# Patient Record
Sex: FEMALE | Race: WHITE | NOT HISPANIC OR LATINO | Employment: STUDENT | URBAN - METROPOLITAN AREA
[De-identification: names, ages, dates, MRNs, and addresses within clinical notes are randomized per-mention and may not be internally consistent; named-entity substitution may affect disease eponyms.]

---
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
## 2025-01-08 ENCOUNTER — TELEPHONE (OUTPATIENT)
Dept: BEHAVIORAL HEALTH | Facility: PSYCHIATRIC FACILITY | Age: 20
End: 2025-01-08
Payer: COMMERCIAL

## 2025-01-08 DIAGNOSIS — F90.0 ATTENTION DEFICIT HYPERACTIVITY DISORDER (ADHD), PREDOMINANTLY INATTENTIVE TYPE: ICD-10-CM

## 2025-01-08 RX ORDER — METHYLPHENIDATE HYDROCHLORIDE 40 MG/1
40 CAPSULE, EXTENDED RELEASE ORAL EVERY MORNING
Qty: 30 CAPSULE | Refills: 0 | Status: SHIPPED | OUTPATIENT
Start: 2025-01-08 | End: 2025-01-10 | Stop reason: SDUPTHER

## 2025-01-08 NOTE — TELEPHONE ENCOUNTER
----- Message from TRISTEN ALDRICH sent at 1/7/2025  2:38 PM PST -----  Regarding: med refill  Hello patient is requesting a med refill of methylphenidate (RITALIN LA) 40 MG SR capsule. The pharm is [id number] - [place], [place] - [address] [facility].    Thank you!

## 2025-01-09 ENCOUNTER — PATIENT MESSAGE (OUTPATIENT)
Dept: BEHAVIORAL HEALTH | Facility: PSYCHIATRIC FACILITY | Age: 20
End: 2025-01-09
Payer: COMMERCIAL

## 2025-01-09 DIAGNOSIS — F90.0 ATTENTION DEFICIT HYPERACTIVITY DISORDER (ADHD), PREDOMINANTLY INATTENTIVE TYPE: ICD-10-CM

## 2025-01-10 ENCOUNTER — PATIENT MESSAGE (OUTPATIENT)
Dept: BEHAVIORAL HEALTH | Facility: PSYCHIATRIC FACILITY | Age: 20
End: 2025-01-10
Payer: COMMERCIAL

## 2025-01-10 DIAGNOSIS — F90.0 ATTENTION DEFICIT HYPERACTIVITY DISORDER (ADHD), PREDOMINANTLY INATTENTIVE TYPE: ICD-10-CM

## 2025-01-10 RX ORDER — METHYLPHENIDATE HYDROCHLORIDE 40 MG/1
40 CAPSULE, EXTENDED RELEASE ORAL EVERY MORNING
Qty: 30 CAPSULE | Refills: 0 | Status: SHIPPED | OUTPATIENT
Start: 2025-01-10 | End: 2025-01-10 | Stop reason: SDUPTHER

## 2025-01-10 RX ORDER — METHYLPHENIDATE HYDROCHLORIDE 40 MG/1
40 CAPSULE, EXTENDED RELEASE ORAL EVERY MORNING
Qty: 30 CAPSULE | Refills: 0 | Status: SHIPPED | OUTPATIENT
Start: 2025-01-10 | End: 2025-02-09

## 2025-01-10 NOTE — PROGRESS NOTES
Pharmacy is out of prescription of methylphenidate cd 40mg. Sending new prescription to [facility] in [place]

## 2025-01-21 ENCOUNTER — OFFICE VISIT (OUTPATIENT)
Dept: BEHAVIORAL HEALTH | Facility: PSYCHIATRIC FACILITY | Age: 20
End: 2025-01-21
Payer: COMMERCIAL

## 2025-01-21 VITALS
WEIGHT: 223 LBS | OXYGEN SATURATION: 95 % | HEART RATE: 89 BPM | SYSTOLIC BLOOD PRESSURE: 120 MMHG | BODY MASS INDEX: 31.22 KG/M2 | HEIGHT: 71 IN | DIASTOLIC BLOOD PRESSURE: 72 MMHG

## 2025-01-21 DIAGNOSIS — F33.41 MAJOR DEPRESSIVE DISORDER, RECURRENT EPISODE, IN PARTIAL REMISSION (HCC): Primary | ICD-10-CM

## 2025-01-21 DIAGNOSIS — F41.1 GENERALIZED ANXIETY DISORDER: ICD-10-CM

## 2025-01-21 DIAGNOSIS — F90.0 ATTENTION DEFICIT HYPERACTIVITY DISORDER (ADHD), PREDOMINANTLY INATTENTIVE TYPE: ICD-10-CM

## 2025-01-21 PROCEDURE — 3074F SYST BP LT 130 MM HG: CPT | Performed by: PSYCHIATRY & NEUROLOGY

## 2025-01-21 PROCEDURE — 3078F DIAST BP <80 MM HG: CPT | Performed by: PSYCHIATRY & NEUROLOGY

## 2025-01-21 PROCEDURE — 99214 OFFICE O/P EST MOD 30 MIN: CPT | Performed by: PSYCHIATRY & NEUROLOGY

## 2025-01-21 ASSESSMENT — PATIENT HEALTH QUESTIONNAIRE - PHQ9
5. POOR APPETITE OR OVEREATING: 1 - SEVERAL DAYS
CLINICAL INTERPRETATION OF PHQ2 SCORE: 2
SUM OF ALL RESPONSES TO PHQ QUESTIONS 1-9: 6

## 2025-01-21 ASSESSMENT — FIBROSIS 4 INDEX: FIB4 SCORE: 0.3

## 2025-01-22 NOTE — PROGRESS NOTES
"[facility] Outpatient Psychiatric Follow Up Note  Evaluation completed by: Boogie Edgar M.D.   Date of Service: 01/21/2025  Appointment type: in-office appointment.  Information below was collected from: patient and patient's mother    CHIEF COMPLIANT:  Follow-Up (ADHD, depression, anxiety)        HPI:   Patient is a 19 y.o. old female who presents today for Follow-Up (ADHD, depression, anxiety)  Patient presents with mother for appointment and has mother accompanied her into appointment patient reports she is doing \"good\".  She reports she never started the Zoloft but feels that she is doing well.  She has continued to do both individual weekly therapy as well as neurofeedback therapy and feels like her depression has been doing really good.    ADHD: Patient reports that she is doing \"good\".  She is going to school and working and states that she has been managing both.  Over the winter break she has been working full-time and states that meds are good enough to get her through the day.  She denies having significant symptoms of mental exhaustion.  She reports she is able to get tasks done he denies issues with motivation.  She is still getting up early in the morning to go to the gym.  She denies issues with forgetfulness, losing things, or getting easily distracted.    PSYCHIATRIC REVIEW OF SYSTEMS:current symptoms as reported by pt.  Depression: Patient denies Depressed mood, Difficulty sleeping, Anhedonia, Sense of hopelessness, Low energy, Low appetite, Difficulty concentrating, Passive thoughts of death, and Suicidal ideation  Taryn: Patient denies any change in mood, increased energy, or marked irritability  Anxiety/Panic Attacks: Patient reporting anxiety has improved with therapy and neuro feedback.  She reports not worrying as much and better able to do things outside of family.  She continues to report low-grade worries about the future in school but states that is not interfering with her " ability to function and she does not feel like she is spending a lot of effort or time worrying.  Psychosis: Patient denies auditory hallucinations and visual hallucinations  Safety: Patient has thoughts of harm towards others    CURRENT MEDICATIONS    Current Outpatient Medications:     methylphenidate (RITALIN LA) 40 MG SR capsule, Take 1 Capsule by mouth every morning for 30 days., Disp: 30 Capsule, Rfl: 0    sertraline (ZOLOFT) 50 MG Tab, Take 1 Tablet by mouth every day., Disp: 30 Tablet, Rfl: 11    guanFACINE (TENEX) 1 MG Tab, TAKE 1 AND 1/2 TABLETS BY MOUTH EVERY DAY, Disp: 45 Tablet, Rfl: 2    LO LOESTRIN FE 1 MG-10 MCG / 10 MCG Tab, Take 1 Tablet by mouth every day., Disp: , Rfl:      REVIEW OF SYSTEMS   Review of Systems   Constitutional:  Negative for malaise/fatigue and weight loss.   Respiratory:  Negative for cough and wheezing.    Cardiovascular:  Negative for chest pain and palpitations.   Gastrointestinal:  Negative for constipation, diarrhea, nausea and vomiting.   Neurological:  Negative for dizziness and headaches.     Neurologic: no tics, tremors, dystonia, or dyskinesia     PAST MEDICAL HISTORY  Past Medical History:   Diagnosis Date    ADD (attention deficit disorder)     Anxiety     NADIA (generalized anxiety disorder) 09/28/2016    Sensory processing difficulty      No Known Allergies  History reviewed. No pertinent surgical history.   Family History   Problem Relation Age of Onset    Hypertension Mother     Anxiety disorder Mother     Depression Mother     BRCA 2 Mother         prophylactic mastectomy    Hypertension Father     Anxiety disorder Father     Sleep Apnea Father     No Known Problems Brother     No Known Problems Brother     No Known Problems Maternal Aunt     No Known Problems Paternal Aunt     Alcohol abuse Paternal Uncle     Drug abuse Paternal Uncle     No Known Problems Maternal Grandmother     Genetic Disorder Maternal Grandfather     Breast Cancer Maternal Grandfather      "Anxiety disorder Maternal Grandfather     Depression Maternal Grandfather     BRCA 2 Maternal Grandfather     Anxiety disorder Paternal Grandmother     Drug abuse Paternal Grandmother     Alcohol abuse Paternal Grandmother     Drug abuse Paternal Grandfather     Alcohol abuse Paternal Grandfather     Bipolar disorder Cousin      Social History     Socioeconomic History    Marital status: Single    Number of children: 0   Tobacco Use    Smoking status: Never     Passive exposure: Never    Smokeless tobacco: Never   Vaping Use    Vaping status: Never Used   Substance and Sexual Activity    Alcohol use: Not Currently    Drug use: No    Sexual activity: Yes     Partners: Male     Birth control/protection: Pill   Other Topics Concern    Inadequate sleep No         PSYCHIATRIC EXAMINATION   /72 (BP Location: Left arm, Patient Position: Sitting, BP Cuff Size: Adult)   Pulse 89   Ht 1.791 m (5' 10.5\") Comment: with shoes  Wt 101 kg (223 lb)   SpO2 95%   BMI 31.54 kg/m²   Physical Exam  Constitutional:       General: She is not in acute distress.     Appearance: Normal appearance. She is normal weight.   Neurological:      Mental Status: She is alert.   Psychiatric:         Attention and Perception: Attention and perception normal. She is attentive. She does not perceive auditory or visual hallucinations.         Mood and Affect: Mood is not anxious. Affect is not labile, tearful or inappropriate.         Speech: Speech normal. She is communicative. Speech is not rapid and pressured, delayed or tangential.         Behavior: Behavior normal. Behavior is not agitated, slowed or withdrawn. Behavior is cooperative.         Thought Content: Thought content normal. Thought content is not paranoid or delusional. Thought content does not include homicidal or suicidal ideation.         Cognition and Memory: Cognition and memory normal. Cognition is not impaired. Memory is not impaired.         Judgment: Judgment normal. " Judgment is not impulsive or inappropriate.      Comments: Behavior: Patient sits calmly and engages in conversation with clinician and mother.  She is responsive and well engaged to both parties.  She demonstrates minimal fidgetiness and overall is appropriate during appointment.  Thought process: Linear and goal directed.  On task and not distractible.          SCREENINGS:      3/7/2023     1:00 PM 9/10/2024     2:55 PM 1/21/2025     4:00 PM   Depression Screen (PHQ-2/PHQ-9)   PHQ-2 Total Score  6    PHQ-2 Total Score 4  2   PHQ-9 Total Score  21    PHQ-9 Total Score 12  6          PREVENTATIVE CARE  Medication Monitoring: Stimulants: Reviewed height, weight, blood pressure, and pulse.  No concerns. Signed Controlled Substance Agreement.       [place]  records   reviewed.  No concerns about misuse of controlled substance.    CURRENT RISK ASSESSMENT       Suicide: Low       Homicide: Low       Self-Harm: Low       Relapse: Not applicable       Crisis Safety Plan Reviewed Not Indicated    ASSESSMENT/DIAGNOSES/PLAN  Major depressive disorder, recurrent episode, in partial remission (HCC)  Problem type: Chronic Illness, Stable    Assessment: Patient demonstrated significant response to depressive symptoms to discontinuation/taper of Cymbalta.  While sertraline was ordered in transition patient and restarted medication.  She has been engaged in both neurofeedback and individual weekly therapy which has not proved patient symptoms.    Plan  Medication:   -Currently we will hold off on medications specific for depression.  Patient did  prescription of Zoloft and recommended she can start this if feeling she is having continued breakthrough symptoms without full remission.  Discussed calling provider to notify if restarting Zoloft.  -Continue methylphenidate and guanfacine for ADHD.  This likely helps patient's depressive symptoms as well.      Therapy:   Continue neurofeedback  Continue individual weekly  therapy          Attention deficit hyperactivity disorder (ADHD), predominantly inattentive type  Problem type: Chronic Illness, Stable    Assessment: Patient's symptoms improved as symptoms of depression have improved as well.    Plan  Medication:    - Continue Ritalin LA 40mg po qday   - Continue Guanfacine 1mg po qhs          NADIA (generalized anxiety disorder) with panic attacks  Problem type: Chronic Illness, Stable    Assessment: Exacerbation of anxiety has improved with therapy and improvement of depressive episode.  Patient doing well currently overall with minimal symptoms of anxiety    Plan  Medication:   - Continue methylphenidate and guanfacine 1mg po qhs for ADHD and anxiety    Therapy: Recommend continuing individual psychotherapy and no feedback         Medication options, alternatives (including no medications) and medication risks/benefits/side effects were discussed in detail.  The patient was advised to call, message clinician on [employer], or come in to the clinic if symptoms worsen or if questions/issues regarding their medications arise.  The patient verbalized understanding and agreement.    The patient was educated to call 911, call the suicide hotline, or go to the local ER if having thoughts of suicide or homicide.  The patient verbalized understanding and agreement.   The proposed treatment plan was discussed with the patient who was provided the opportunity to ask questions and make suggestions regarding alternative treatment. Patient verbalized understanding and expressed agreement with the plan.      Recommending follow-up in 4 to 6 weeks.  Mother working with insurance on coverage and payment options.  Mother to call for setting up another appointment.    Please note that this dictation was created using voice recognition software. I have reviewed and attempted to correct errors, but there may be spelling, grammar and possibly content errors that I did not discover before finalizing the  note.    Boogie Edgar MD

## 2025-01-26 ASSESSMENT — ENCOUNTER SYMPTOMS
COUGH: 0
NAUSEA: 0
DIZZINESS: 0
CONSTIPATION: 0
VOMITING: 0
PALPITATIONS: 0
HEADACHES: 0
WEIGHT LOSS: 0
WHEEZING: 0
DIARRHEA: 0

## 2025-01-26 NOTE — ASSESSMENT & PLAN NOTE
Problem type: Chronic Illness, Stable    Assessment: Patient's symptoms improved as symptoms of depression have improved as well.    Plan  Medication:    - Continue Ritalin LA 40mg po qday   - Continue Guanfacine 1mg po qhs

## 2025-01-26 NOTE — ASSESSMENT & PLAN NOTE
Problem type: Chronic Illness, Stable    Assessment: Exacerbation of anxiety has improved with therapy and improvement of depressive episode.  Patient doing well currently overall with minimal symptoms of anxiety    Plan  Medication:   - Continue methylphenidate and guanfacine 1mg po qhs for ADHD and anxiety    Therapy: Recommend continuing individual psychotherapy and no feedback

## 2025-01-26 NOTE — ASSESSMENT & PLAN NOTE
Problem type: Chronic Illness, Stable    Assessment: Patient demonstrated significant response to depressive symptoms to discontinuation/taper of Cymbalta.  While sertraline was ordered in transition patient and restarted medication.  She has been engaged in both neurofeedback and individual weekly therapy which has not proved patient symptoms.    Plan  Medication:   -Currently we will hold off on medications specific for depression.  Patient did  prescription of Zoloft and recommended she can start this if feeling she is having continued breakthrough symptoms without full remission.  Discussed calling provider to notify if restarting Zoloft.  -Continue methylphenidate and guanfacine for ADHD.  This likely helps patient's depressive symptoms as well.      Therapy:   Continue neurofeedback  Continue individual weekly therapy

## 2025-02-06 ENCOUNTER — TELEPHONE (OUTPATIENT)
Dept: BEHAVIORAL HEALTH | Facility: PSYCHIATRIC FACILITY | Age: 20
End: 2025-02-06
Payer: COMMERCIAL

## 2025-02-06 DIAGNOSIS — F90.0 ATTENTION DEFICIT HYPERACTIVITY DISORDER (ADHD), PREDOMINANTLY INATTENTIVE TYPE: ICD-10-CM

## 2025-02-06 RX ORDER — METHYLPHENIDATE HYDROCHLORIDE 40 MG/1
40 CAPSULE, EXTENDED RELEASE ORAL EVERY MORNING
Qty: 30 CAPSULE | Refills: 0 | Status: SHIPPED | OUTPATIENT
Start: 2025-02-06 | End: 2025-02-06 | Stop reason: SDUPTHER

## 2025-02-06 RX ORDER — METHYLPHENIDATE HYDROCHLORIDE 40 MG/1
40 CAPSULE, EXTENDED RELEASE ORAL EVERY MORNING
Qty: 30 CAPSULE | Refills: 0 | Status: SHIPPED | OUTPATIENT
Start: 2025-03-06 | End: 2025-04-05

## 2025-02-06 RX ORDER — METHYLPHENIDATE HYDROCHLORIDE 40 MG/1
40 CAPSULE, EXTENDED RELEASE ORAL EVERY MORNING
Qty: 30 CAPSULE | Refills: 0 | Status: SHIPPED | OUTPATIENT
Start: 2025-02-06 | End: 2025-03-08

## 2025-02-06 RX ORDER — METHYLPHENIDATE HYDROCHLORIDE 40 MG/1
40 CAPSULE, EXTENDED RELEASE ORAL EVERY MORNING
Qty: 30 CAPSULE | Refills: 0 | Status: SHIPPED | OUTPATIENT
Start: 2025-03-06 | End: 2025-02-06 | Stop reason: SDUPTHER

## 2025-02-06 NOTE — TELEPHONE ENCOUNTER
----- Message from Romana ROBERTO sent at 2/5/2025  2:11 PM PST -----  Regarding: PT Med Refill  Patient is asking for a med refill on the med methylphenidate (RITALIN LA) 40 MG SR capsule. Patient does not have a follow up appt, last seen 1/21/25. If the med can be filled, please send to [id number] - [place], [place] - [phone] [[id number]]    Thank you!

## 2025-02-07 RX ORDER — GUANFACINE 1 MG/1
1.5 TABLET ORAL
Qty: 45 TABLET | Refills: 2 | Status: SHIPPED | OUTPATIENT
Start: 2025-02-07

## 2025-03-31 ENCOUNTER — PATIENT MESSAGE (OUTPATIENT)
Dept: BEHAVIORAL HEALTH | Facility: PSYCHIATRIC FACILITY | Age: 20
End: 2025-03-31
Payer: COMMERCIAL

## 2025-03-31 DIAGNOSIS — F90.0 ATTENTION DEFICIT HYPERACTIVITY DISORDER (ADHD), PREDOMINANTLY INATTENTIVE TYPE: ICD-10-CM

## 2025-04-01 RX ORDER — METHYLPHENIDATE HYDROCHLORIDE 40 MG/1
40 CAPSULE, EXTENDED RELEASE ORAL EVERY MORNING
Qty: 30 CAPSULE | Refills: 0 | Status: SHIPPED | OUTPATIENT
Start: 2025-04-01 | End: 2025-05-01

## 2025-04-01 RX ORDER — METHYLPHENIDATE HYDROCHLORIDE 40 MG/1
40 CAPSULE, EXTENDED RELEASE ORAL EVERY MORNING
Qty: 30 CAPSULE | Refills: 0 | Status: SHIPPED | OUTPATIENT
Start: 2025-04-30 | End: 2025-05-30

## 2025-06-10 ENCOUNTER — TELEMEDICINE (OUTPATIENT)
Dept: BEHAVIORAL HEALTH | Facility: PSYCHIATRIC FACILITY | Age: 20
End: 2025-06-10

## 2025-06-10 DIAGNOSIS — F41.1 GENERALIZED ANXIETY DISORDER: Primary | ICD-10-CM

## 2025-06-10 DIAGNOSIS — F33.42 MAJOR DEPRESSIVE DISORDER, RECURRENT, IN FULL REMISSION (HCC): ICD-10-CM

## 2025-06-10 DIAGNOSIS — F90.0 ATTENTION DEFICIT HYPERACTIVITY DISORDER (ADHD), PREDOMINANTLY INATTENTIVE TYPE: ICD-10-CM

## 2025-06-10 PROCEDURE — 99214 OFFICE O/P EST MOD 30 MIN: CPT | Mod: 95 | Performed by: PSYCHIATRY & NEUROLOGY

## 2025-06-10 RX ORDER — METHYLPHENIDATE HYDROCHLORIDE 40 MG/1
40 CAPSULE, EXTENDED RELEASE ORAL EVERY MORNING
Qty: 30 CAPSULE | Refills: 0 | Status: SHIPPED | OUTPATIENT
Start: 2025-06-10 | End: 2025-07-10

## 2025-06-10 RX ORDER — METHYLPHENIDATE HYDROCHLORIDE 40 MG/1
40 CAPSULE, EXTENDED RELEASE ORAL EVERY MORNING
Qty: 30 CAPSULE | Refills: 0 | Status: SHIPPED | OUTPATIENT
Start: 2025-08-08 | End: 2025-09-07

## 2025-06-10 RX ORDER — GUANFACINE 1 MG/1
1.5 TABLET ORAL
Qty: 45 TABLET | Refills: 2 | Status: SHIPPED | OUTPATIENT
Start: 2025-06-10

## 2025-06-10 RX ORDER — METHYLPHENIDATE HYDROCHLORIDE 40 MG/1
40 CAPSULE, EXTENDED RELEASE ORAL EVERY MORNING
Qty: 30 CAPSULE | Refills: 0 | Status: SHIPPED | OUTPATIENT
Start: 2025-07-09 | End: 2025-08-08

## 2025-06-10 NOTE — PROGRESS NOTES
"[facility] Outpatient Psychiatric Follow Up Note  Evaluation completed by: Boogie Edgar M.D.   Date of Service: 06/10/2025  Appointment type: virtual/telepsychiatry appointment: This evaluation was conducted via Zoom using secure and encrypted videoconferencing technology. The patient was in their home in the [facility].   The patient's identity was confirmed and verbal consent was obtained for this virtual visit.  Information below was collected from: patient    CHIEF COMPLIANT:  Follow-Up (-of ADHD, Depression, and Anxiety)        HPI:   Patient is a 19 y.o. old female who presents today for  Follow-Up (-of ADHD, Depression, and Anxiety)  Patient presents for appointment. She reports that she is doing \"good\". She reports she is working full time at [employer] and things are going well. She states the schools semester ended up \"good\" and that she is looking at taking online classes after completion of Associates Degree.     ADHD: Patient reports she is doing \"good.\" She feels like mediations are helpful  and denies issues with attention, concentration, or focus while at work or when she was in school. She denies issues with task completion or forgetfulness. She feels like medications are still lasting long enough. She reports that she continues to get up and exercise in the morning and has a good routine going.         PSYCHIATRIC REVIEW OF SYSTEMS:current symptoms as reported by pt.  Depression: Patient Denies Depressed mood, Difficulty sleeping, Low energy, Low appetite, Passive thoughts of death, and Suicidal ideation  Taryn: Patient denies Elevated mood, Increased goal-directed activity, and Decreased need for sleep  Anxiety/Panic Attacks: Patient endorses some mild anxiety at work but denies any major anxiety or panic attacks  Psychosis: Denies auditory hallucinations and visual hallucinations  Safety: Denies thoughts of harm to others    CURRENT MEDICATIONS  Current Medications[1] "     REVIEW OF SYSTEMS   Review of Systems   Constitutional:  Negative for malaise/fatigue and weight loss.   Respiratory:  Negative for cough and wheezing.    Cardiovascular:  Negative for chest pain and palpitations.   Gastrointestinal:  Negative for constipation, diarrhea, nausea and vomiting.   Neurological:  Negative for dizziness and headaches.     Neurologic: no tics, tremors, dystonia, or dyskinesia     PAST MEDICAL HISTORY  Past Medical History[2]  Allergies[3]  Past Surgical History[4]   Family History   Problem Relation Age of Onset    Hypertension Mother     Anxiety disorder Mother     Depression Mother     BRCA 2 Mother         prophylactic mastectomy    Hypertension Father     Anxiety disorder Father     Sleep Apnea Father     No Known Problems Brother     No Known Problems Brother     No Known Problems Maternal Aunt     No Known Problems Paternal Aunt     Alcohol abuse Paternal Uncle     Drug abuse Paternal Uncle     No Known Problems Maternal Grandmother     Genetic Disorder Maternal Grandfather     Breast Cancer Maternal Grandfather     Anxiety disorder Maternal Grandfather     Depression Maternal Grandfather     BRCA 2 Maternal Grandfather     Anxiety disorder Paternal Grandmother     Drug abuse Paternal Grandmother     Alcohol abuse Paternal Grandmother     Drug abuse Paternal Grandfather     Alcohol abuse Paternal Grandfather     Bipolar disorder Cousin      Social History[5]      PSYCHIATRIC EXAMINATION   There were no vitals taken for this visit.  Physical Exam  Constitutional:       General: She is not in acute distress.     Appearance: Normal appearance. She is normal weight.   Neurological:      Mental Status: She is alert.   Psychiatric:         Attention and Perception: Attention and perception normal. She is attentive. She does not perceive auditory or visual hallucinations.         Mood and Affect: Mood is not anxious. Affect is not labile, tearful or inappropriate.         Speech: Speech  normal. She is communicative. Speech is not rapid and pressured, delayed or tangential.         Behavior: Behavior normal. Behavior is not agitated, slowed or withdrawn. Behavior is cooperative.         Thought Content: Thought content normal. Thought content is not paranoid or delusional. Thought content does not include homicidal or suicidal ideation.         Cognition and Memory: Cognition and memory normal. Cognition is not impaired. Memory is not impaired.         Judgment: Judgment normal. Judgment is not impulsive or inappropriate.      Comments: Behavior: engaged on video, conversive, no concerns via video exam  Thought Process: linear, Goal-Directed, organized.           SCREENINGS:      3/7/2023     1:00 PM 9/10/2024     2:55 PM 1/21/2025     4:00 PM   Depression Screen (PHQ-2/PHQ-9)   PHQ-2 Total Score  6    PHQ-2 Total Score 4  2   PHQ-9 Total Score  21    PHQ-9 Total Score 12  6            [place]  records   reviewed.  No concerns about misuse of controlled substance.    CURRENT RISK ASSESSMENT       Suicide: Low       Homicide: Low       Self-Harm: Low       Relapse: Not applicable       Crisis Safety Plan Reviewed Not Indicated    ASSESSMENT/DIAGNOSES/PLAN  Attention deficit hyperactivity disorder (ADHD), predominantly inattentive type  Problem type: Chronic Illness, Stable    Assessment: Patient's symptoms improved as symptoms of depression have improved as well.    Plan  Medication:    - Continue Ritalin LA 40mg po qday   - Continue Guanfacine 1mg po qhs          NADIA (generalized anxiety disorder) with panic attacks  Problem type: Chronic Illness, Stable    Assessment: Exacerbation of anxiety has improved with therapy and improvement of depressive episode.  Patient doing well currently overall with minimal symptoms of anxiety    Plan  Medication:   - Continue methylphenidate and guanfacine 1mg po qhs for ADHD and anxiety    Therapy: No longer in therapy; discussed options.         Major depressive  disorder, recurrent, in full remission (HCC)  Problem type: Chronic Illness, Stable    Assessment: Patient now meeting criteria for full remission,.     Plan  Medication:   -Currently we will hold off on medications specific for depression.  Patient did  prescription of Zoloft and recommended she can start this if feeling she is having continued breakthrough symptoms without full remission.  Discussed calling provider to notify if restarting Zoloft.  -Continue methylphenidate and guanfacine for ADHD.  This likely helps patient's depressive symptoms as well.      Therapy:   No longer in therapy; discussed options             Medication options, alternatives (including no medications) and medication risks/benefits/side effects were discussed in detail.  The patient was advised to call, message clinician on [employer], or come in to the clinic if symptoms worsen or if questions/issues regarding their medications arise.  The patient verbalized understanding and agreement.    The patient was educated to call 911, call the suicide hotline, or go to the local ER if having thoughts of suicide or homicide.  The patient verbalized understanding and agreement.   The proposed treatment plan was discussed with the patient who was provided the opportunity to ask questions and make suggestions regarding alternative treatment. Patient verbalized understanding and expressed agreement with the plan.      Return in about 3 months (around 9/10/2025). To 4 months    Please note that this dictation was created using voice recognition software. I have reviewed and attempted to correct errors, but there may be spelling, grammar and possibly content errors that I did not discover before finalizing the note.    Boogie Edgar MD         [1]   Current Outpatient Medications:     guanFACINE (TENEX) 1 MG Tab, TAKE 1 AND 1/2 TABLETS BY MOUTH EVERY DAY, Disp: 45 Tablet, Rfl: 2    sertraline (ZOLOFT) 50 MG Tab, Take 1 Tablet by mouth every  day., Disp: 30 Tablet, Rfl: 11    LO LOESTRIN FE 1 MG-10 MCG / 10 MCG Tab, Take 1 Tablet by mouth every day., Disp: , Rfl:   [2]   Past Medical History:  Diagnosis Date    ADD (attention deficit disorder)     Anxiety     NADIA (generalized anxiety disorder) 09/28/2016    Sensory processing difficulty    [3] No Known Allergies  [4] No past surgical history on file.  [5]   Social History  Socioeconomic History    Marital status: Single    Number of children: 0   Tobacco Use    Smoking status: Never     Passive exposure: Never    Smokeless tobacco: Never   Vaping Use    Vaping status: Never Used   Substance and Sexual Activity    Alcohol use: Not Currently    Drug use: No    Sexual activity: Yes     Partners: Male     Birth control/protection: Pill   Other Topics Concern    Inadequate sleep No

## 2025-06-16 ASSESSMENT — ENCOUNTER SYMPTOMS
CONSTIPATION: 0
HEADACHES: 0
VOMITING: 0
NAUSEA: 0
COUGH: 0
WEIGHT LOSS: 0
DIARRHEA: 0
PALPITATIONS: 0
DIZZINESS: 0
WHEEZING: 0

## 2025-06-16 NOTE — ASSESSMENT & PLAN NOTE
Problem type: Chronic Illness, Stable    Assessment: Patient now meeting criteria for full remission,.     Plan  Medication:   -Currently we will hold off on medications specific for depression.  Patient did  prescription of Zoloft and recommended she can start this if feeling she is having continued breakthrough symptoms without full remission.  Discussed calling provider to notify if restarting Zoloft.  -Continue methylphenidate and guanfacine for ADHD.  This likely helps patient's depressive symptoms as well.      Therapy:   No longer in therapy; discussed options

## 2025-06-16 NOTE — ASSESSMENT & PLAN NOTE
Problem type: Chronic Illness, Stable    Assessment: Exacerbation of anxiety has improved with therapy and improvement of depressive episode.  Patient doing well currently overall with minimal symptoms of anxiety    Plan  Medication:   - Continue methylphenidate and guanfacine 1mg po qhs for ADHD and anxiety    Therapy: No longer in therapy; discussed options.